# Patient Record
Sex: MALE | Race: WHITE | NOT HISPANIC OR LATINO | Employment: UNEMPLOYED | URBAN - METROPOLITAN AREA
[De-identification: names, ages, dates, MRNs, and addresses within clinical notes are randomized per-mention and may not be internally consistent; named-entity substitution may affect disease eponyms.]

---

## 2023-04-02 ENCOUNTER — HOSPITAL ENCOUNTER (EMERGENCY)
Facility: HOSPITAL | Age: 43
Discharge: HOME | End: 2023-04-03
Attending: EMERGENCY MEDICINE

## 2023-04-02 VITALS
OXYGEN SATURATION: 96 % | WEIGHT: 202 LBS | HEART RATE: 101 BPM | RESPIRATION RATE: 20 BRPM | BODY MASS INDEX: 26.77 KG/M2 | HEIGHT: 73 IN | DIASTOLIC BLOOD PRESSURE: 94 MMHG | TEMPERATURE: 99 F | SYSTOLIC BLOOD PRESSURE: 152 MMHG

## 2023-04-02 DIAGNOSIS — H16.002 CORNEAL ULCERATION, LEFT: Primary | ICD-10-CM

## 2023-04-02 PROCEDURE — 25000003 PHARM REV CODE 250: Performed by: EMERGENCY MEDICINE

## 2023-04-02 PROCEDURE — 99283 EMERGENCY DEPT VISIT LOW MDM: CPT

## 2023-04-02 RX ORDER — TETRACAINE HYDROCHLORIDE 5 MG/ML
2 SOLUTION OPHTHALMIC
Status: COMPLETED | OUTPATIENT
Start: 2023-04-02 | End: 2023-04-03

## 2023-04-03 PROCEDURE — 87186 SC STD MICRODIL/AGAR DIL: CPT | Performed by: EMERGENCY MEDICINE

## 2023-04-03 PROCEDURE — 87077 CULTURE AEROBIC IDENTIFY: CPT | Performed by: EMERGENCY MEDICINE

## 2023-04-03 PROCEDURE — 25000003 PHARM REV CODE 250: Performed by: EMERGENCY MEDICINE

## 2023-04-03 PROCEDURE — 87070 CULTURE OTHR SPECIMN AEROBIC: CPT | Performed by: EMERGENCY MEDICINE

## 2023-04-03 RX ORDER — MOXIFLOXACIN 5 MG/ML
1 SOLUTION/ DROPS OPHTHALMIC
Qty: 11.2 ML | Refills: 0 | Status: SHIPPED | OUTPATIENT
Start: 2023-04-03 | End: 2023-04-10

## 2023-04-03 RX ORDER — ERYTHROMYCIN 5 MG/G
OINTMENT OPHTHALMIC
Status: COMPLETED | OUTPATIENT
Start: 2023-04-03 | End: 2023-04-03

## 2023-04-03 RX ORDER — IBUPROFEN 600 MG/1
600 TABLET ORAL
Status: COMPLETED | OUTPATIENT
Start: 2023-04-03 | End: 2023-04-03

## 2023-04-03 RX ADMIN — TETRACAINE HYDROCHLORIDE 2 DROP: 5 SOLUTION OPHTHALMIC at 12:04

## 2023-04-03 RX ADMIN — ERYTHROMYCIN 1 INCH: 5 OINTMENT OPHTHALMIC at 12:04

## 2023-04-03 RX ADMIN — IBUPROFEN 600 MG: 600 TABLET ORAL at 12:04

## 2023-04-03 RX ADMIN — FLUORESCEIN SODIUM 1 EACH: 1 STRIP OPHTHALMIC at 12:04

## 2023-04-03 NOTE — ED PROVIDER NOTES
Encounter Date: 4/2/2023       History     Chief Complaint   Patient presents with    Eye Pain     Using a  tool approximately 2 weeks ago and got a foreign body in the left eye; had been watery the whole time and had gotten better and yesterday worse; feels like there is a film and a yellow-francis discharge; feels like sand in the eye and burns      43-year-old male, here for evaluation and treatment of left eye pain, photophobia, and purulent discharge.  Discharge and pain started yesterday.  Patient states that 2 weeks ago he was grinding metal, and later that evening he began having a foreign body sensation.  He states the foreign body sensation was gone the next day, so he did not seek any medical treatment.  He did have some mild eye irritation every so often thereafter until yesterday, when he began having blurred vision, pain, and discharge from the eye.  He has not tried any antibiotics or pain medicine at home.  Pain is worse with bright light, and movement of the eye.  He does not wear contact lenses.  He states that he wore glasses and 2nd degrade, but has not needed them since.    Review of patient's allergies indicates:   Allergen Reactions    Opioids - morphine analogues Nausea And Vomiting     No past medical history on file.  No past surgical history on file.  No family history on file.     Review of Systems   Constitutional: Negative.    HENT: Negative.  Negative for facial swelling.    Eyes:  Positive for photophobia, pain, discharge, redness and visual disturbance.   Respiratory:  Negative for cough, chest tightness, shortness of breath, wheezing and stridor.    Cardiovascular:  Negative for chest pain and palpitations.   Gastrointestinal: Negative.    Endocrine: Negative.    Genitourinary: Negative.    Musculoskeletal: Negative.    Skin: Negative.    Allergic/Immunologic: Negative.    Neurological: Negative.    Hematological: Negative.    Psychiatric/Behavioral: Negative.       Physical Exam      Initial Vitals   BP Pulse Resp Temp SpO2   04/02/23 2227 04/02/23 2227 04/02/23 2227 04/02/23 2241 04/02/23 2227   (!) 152/94 101 20 98.9 °F (37.2 °C) 96 %      MAP       --                Physical Exam    Nursing note and vitals reviewed.  Constitutional: He appears well-developed and well-nourished. He is not diaphoretic. No distress.   HENT:   Head: Normocephalic and atraumatic.   Nose: Nose normal.   Mouth/Throat: Oropharynx is clear and moist. No oropharyngeal exudate.   Eyes: EOM are normal.   Right eye is normal.  Extraocular movements are normal.  Left pupil is constricted, so reaction to light is difficult to determine.  Right pupil appears to be normal.  Left pupil is round.  Left sclera is injected.  The cornea is almost completely opacified.  There appears to be an ulceration measuring a proximally 1 mm at the 8 o'clock position of the cornea, and opacity spreads outward from this area.  The opacity fades as the distance from the ulceration increases.  Pupil can visualized with difficulty.  The limbus at the 1 into o'clock area is clear and well-defined.  There is thick, purulent discharge emanating from the eye.  Staining with fluorescein does not reveal any Steph sign.  No foreign body could be seen, but such a foreign body would likely be invisible because of the opacity.   Neck: Neck supple. No JVD present.   Normal range of motion.  Cardiovascular:  Normal rate, regular rhythm, normal heart sounds and intact distal pulses.           No murmur heard.  Pulmonary/Chest: Breath sounds normal. No stridor. No respiratory distress. He has no wheezes. He has no rhonchi. He has no rales.   Abdominal: Abdomen is soft. Bowel sounds are normal. He exhibits no distension. There is no abdominal tenderness.   Musculoskeletal:         General: No tenderness or edema. Normal range of motion.      Cervical back: Normal range of motion and neck supple.     Neurological: He is alert and oriented to person, place,  and time. He has normal strength. No cranial nerve deficit or sensory deficit. GCS score is 15. GCS eye subscore is 4. GCS verbal subscore is 5. GCS motor subscore is 6.   Skin: Skin is warm and dry. Capillary refill takes less than 2 seconds. No rash noted. No erythema.   Psychiatric: He has a normal mood and affect. His behavior is normal.       ED Course   Procedures  Labs Reviewed   CULTURE, AEROBIC  (SPECIFY SOURCE)   HIV 1 / 2 ANTIBODY   HEPATITIS C ANTIBODY          Imaging Results    None          Medications   fluorescein ophthalmic strip 1 each (1 each Left Eye Given 4/3/23 0010)   TETRAcaine HCl (PF) 0.5 % Drop 2 drop (2 drops Left Eye Given 4/3/23 0009)   ibuprofen tablet 600 mg (600 mg Oral Given 4/3/23 0009)   erythromycin 5 mg/gram (0.5 %) ophthalmic ointment (1 inch Left Eye Given 4/3/23 0050)     Medical Decision Making:   Differential Diagnosis:   Corneal foreign body, corneal abrasion, corneal laceration, corneal ulceration, etc.  ED Management:  Discussed this patient with ophthalmologist on-call, Dr. Kirk, who stated that patient did not need to be seen emergently, but should be seen within the next 24 hours.  She recommended Vigamox drops for antibiosis.  Unfortunately, those drops are not available at this facility, it was thought that ciprofloxacin was available here, so my plan was to give the patient some ciprofloxacin here, and he could fill his prescription for Vigamox in the morning.  Pharmacist was called out from home, and when he arrived here, he realized that we do not have ciprofloxacin either.  Patient will receive a prescription for Vigamox that he will fill at the 24 hour [facility] pharmacy in [place]..  Patient understands that he needs to see an ophthalmologist asap.  He can look for a local ophthalmologist, but if he can not be seen today, he was told that he can follow-up with Dr. Kirk or 1 of her partners in her office.  Patient understands the seriousness this  condition and understands that he could lose his vision if he is noncompliant with his medications and follow-up.                        Clinical Impression:   Final diagnoses:  [H16.002] Corneal ulceration, left (Primary)        ED Disposition Condition    Discharge Stable          ED Prescriptions       Medication Sig Dispense Start Date End Date Auth. Provider    moxifloxacin (VIGAMOX) 0.5 % ophthalmic solution Place 1 drop into the right eye every hour. for 7 days 11.2 mL 4/3/2023 4/10/2023 Fernando Dee MD          Follow-up Information       Follow up With Specialties Details Why Contact Info    Irene Kirk MD Ophthalmology   [address] [facility] [address]  [phone]      Ophthalmologist of your choice  Call today      [facility] Emergency Dept Emergency Medicine  As needed, If symptoms worsen [address] [facility] [address] [phone]             Fernando Dee MD  04/03/23 0207

## 2023-04-03 NOTE — DISCHARGE INSTRUCTIONS
After being discharged from here, go to the pharmacy in [place] as we discussed, and fill the prescription for Vigamox.  Use 1 drop in the left eye every 1 hour until told otherwise by your ophthalmologist.  Later this morning, make an appointment with the ophthalmologist of your choice to be seen today.  If you can not get an appointment, call the office of Dr.Ginny Kirk.  Let her office staff know that I spoke to her about your case, and that she said you could be seen in the office today.  Return here for any worsening signs or symptoms or any other problems.

## 2023-04-05 LAB — BACTERIA SPEC AEROBE CULT: ABNORMAL
